# Patient Record
Sex: MALE | Race: WHITE | Employment: FULL TIME | URBAN - METROPOLITAN AREA
[De-identification: names, ages, dates, MRNs, and addresses within clinical notes are randomized per-mention and may not be internally consistent; named-entity substitution may affect disease eponyms.]

---

## 2017-01-10 ENCOUNTER — TELEPHONE (OUTPATIENT)
Dept: FAMILY MEDICINE CLINIC | Facility: CLINIC | Age: 47
End: 2017-01-10

## 2017-01-10 RX ORDER — AZITHROMYCIN 250 MG/1
TABLET, FILM COATED ORAL
Qty: 6 TABLET | Refills: 0 | Status: SHIPPED | OUTPATIENT
Start: 2017-01-10 | End: 2017-11-29

## 2017-02-06 ENCOUNTER — MED REC SCAN ONLY (OUTPATIENT)
Dept: FAMILY MEDICINE CLINIC | Facility: CLINIC | Age: 47
End: 2017-02-06

## 2017-02-06 ENCOUNTER — NURSE ONLY (OUTPATIENT)
Dept: FAMILY MEDICINE CLINIC | Facility: CLINIC | Age: 47
End: 2017-02-06

## 2017-02-06 DIAGNOSIS — M51.36 DEGENERATION OF LUMBAR INTERVERTEBRAL DISC: ICD-10-CM

## 2017-02-06 DIAGNOSIS — M51.16 LUMBAR DISC HERNIATION WITH RADICULOPATHY: ICD-10-CM

## 2017-02-06 DIAGNOSIS — Z98.890 PERSONAL HISTORY OF SPINE SURGERY: Primary | ICD-10-CM

## 2017-02-06 LAB
ALBUMIN SERPL BCP-MCNC: 4 G/DL (ref 3.5–4.8)
ALBUMIN/GLOB SERPL: 1.3 {RATIO} (ref 1–2)
ALP SERPL-CCNC: 67 U/L (ref 32–100)
ALT SERPL-CCNC: 28 U/L (ref 17–63)
ANION GAP SERPL CALC-SCNC: 8 MMOL/L (ref 0–18)
AST SERPL-CCNC: 27 U/L (ref 15–41)
BILIRUB SERPL-MCNC: 0.8 MG/DL (ref 0.3–1.2)
BUN SERPL-MCNC: 18 MG/DL (ref 8–20)
BUN/CREAT SERPL: 22.2 (ref 10–20)
CALCIUM SERPL-MCNC: 9.4 MG/DL (ref 8.5–10.5)
CHLORIDE SERPL-SCNC: 106 MMOL/L (ref 95–110)
CO2 SERPL-SCNC: 26 MMOL/L (ref 22–32)
CREAT SERPL-MCNC: 0.81 MG/DL (ref 0.5–1.5)
GLOBULIN PLAS-MCNC: 3 G/DL (ref 2.5–3.7)
GLUCOSE SERPL-MCNC: 93 MG/DL (ref 70–99)
OSMOLALITY UR CALC.SUM OF ELEC: 292 MOSM/KG (ref 275–295)
POTASSIUM SERPL-SCNC: 4.6 MMOL/L (ref 3.3–5.1)
PROT SERPL-MCNC: 7 G/DL (ref 5.9–8.4)
SODIUM SERPL-SCNC: 140 MMOL/L (ref 136–144)

## 2017-02-06 PROCEDURE — 80053 COMPREHEN METABOLIC PANEL: CPT | Performed by: FAMILY MEDICINE

## 2017-02-06 PROCEDURE — 36415 COLL VENOUS BLD VENIPUNCTURE: CPT | Performed by: FAMILY MEDICINE

## 2017-02-08 ENCOUNTER — HOSPITAL ENCOUNTER (OUTPATIENT)
Dept: MRI IMAGING | Age: 47
Discharge: HOME | End: 2017-02-08
Attending: NEUROLOGICAL SURGERY
Payer: COMMERCIAL

## 2017-02-08 DIAGNOSIS — M51.16 HERNIATION OF LUMBAR INTERVERTEBRAL DISC WITH RADICULOPATHY: ICD-10-CM

## 2017-02-08 DIAGNOSIS — Z98.890 PERSONAL HISTORY OF SPINE SURGERY: ICD-10-CM

## 2017-02-09 ENCOUNTER — HOSPITAL ENCOUNTER (OUTPATIENT)
Dept: MRI IMAGING | Age: 47
Discharge: HOME | End: 2017-02-09
Attending: NEUROLOGICAL SURGERY
Payer: COMMERCIAL

## 2017-02-09 PROCEDURE — A9575 INJ GADOTERATE MEGLUMI 0.1ML: HCPCS | Performed by: NEUROLOGICAL SURGERY

## 2017-02-09 PROCEDURE — 72158 MRI LUMBAR SPINE W/O & W/DYE: CPT

## 2017-02-22 ENCOUNTER — OFFICE VISIT (OUTPATIENT)
Dept: FAMILY MEDICINE CLINIC | Facility: CLINIC | Age: 47
End: 2017-02-22

## 2017-02-22 VITALS — WEIGHT: 288 LBS | BODY MASS INDEX: 40 KG/M2 | DIASTOLIC BLOOD PRESSURE: 70 MMHG | SYSTOLIC BLOOD PRESSURE: 150 MMHG

## 2017-02-22 DIAGNOSIS — G47.30 SLEEP APNEA, UNSPECIFIED TYPE: ICD-10-CM

## 2017-02-22 DIAGNOSIS — Z98.890 HISTORY OF LUMBAR LAMINECTOMY: ICD-10-CM

## 2017-02-22 DIAGNOSIS — I47.1 SVT (SUPRAVENTRICULAR TACHYCARDIA) (HCC): Primary | ICD-10-CM

## 2017-02-22 DIAGNOSIS — M51.16 LUMBAR DISC HERNIATION WITH RADICULOPATHY: ICD-10-CM

## 2017-02-22 PROCEDURE — 99214 OFFICE O/P EST MOD 30 MIN: CPT | Performed by: FAMILY MEDICINE

## 2017-02-27 PROBLEM — Z98.890 HISTORY OF LUMBAR LAMINECTOMY: Status: ACTIVE | Noted: 2017-02-06

## 2017-03-09 ENCOUNTER — OFFICE VISIT (OUTPATIENT)
Dept: FAMILY MEDICINE CLINIC | Facility: CLINIC | Age: 47
End: 2017-03-09

## 2017-03-09 VITALS — DIASTOLIC BLOOD PRESSURE: 80 MMHG | WEIGHT: 289 LBS | SYSTOLIC BLOOD PRESSURE: 160 MMHG | BODY MASS INDEX: 40 KG/M2

## 2017-03-09 DIAGNOSIS — M51.16 LUMBAR DISC HERNIATION WITH RADICULOPATHY: Primary | ICD-10-CM

## 2017-03-09 DIAGNOSIS — M51.36 DEGENERATION OF LUMBAR INTERVERTEBRAL DISC: ICD-10-CM

## 2017-03-09 DIAGNOSIS — R03.0 ELEVATED BLOOD PRESSURE READING: ICD-10-CM

## 2017-03-09 PROCEDURE — 99213 OFFICE O/P EST LOW 20 MIN: CPT | Performed by: FAMILY MEDICINE

## 2017-03-09 RX ORDER — GABAPENTIN 300 MG/1
300 CAPSULE ORAL 3 TIMES DAILY
Qty: 270 CAPSULE | Refills: 3 | Status: SHIPPED | OUTPATIENT
Start: 2017-03-09 | End: 2017-03-09

## 2017-03-09 RX ORDER — MELOXICAM 15 MG/1
15 TABLET ORAL DAILY
Qty: 90 TABLET | Refills: 3 | Status: SHIPPED | OUTPATIENT
Start: 2017-03-09 | End: 2017-03-09

## 2017-03-09 RX ORDER — GABAPENTIN 300 MG/1
300 CAPSULE ORAL 3 TIMES DAILY
Qty: 270 CAPSULE | Refills: 3 | Status: SHIPPED | OUTPATIENT
Start: 2017-03-09 | End: 2017-11-29

## 2017-03-09 RX ORDER — MELOXICAM 15 MG/1
15 TABLET ORAL DAILY
Qty: 90 TABLET | Refills: 3 | Status: SHIPPED | OUTPATIENT
Start: 2017-03-09 | End: 2017-11-29

## 2017-03-09 NOTE — PROGRESS NOTES
[facility] Family Medicine Office Note  Chief Complaint:   Patient presents with:  Back Pain  Numbness: right leg, hurting in ankle area, worse upon waking      HPI:   This is a 48year old male coming in for      Results for orders pl HYDROcodone-acetaminophen  MG Oral Tab Take 1 tablet by mouth every 6 (six) hours as needed for Pain. Disp: 50 tablet Rfl: 0   docusate sodium 100 MG Oral Cap Take 100 mg by mouth 2 (two) times daily.  Disp: 30 capsule Rfl: 0   naproxen 500 MG Oral normal in all 4 quadrants, no masses, no hepatosplenomegaly. BACK: No tenderness, no spasm, SLR test negative, FROM. Well healed scar. EXTREMITIES:  slr positive 30 degrees.  r  NEURO:  No deficit, normal gait, strength and tone, sensory intact, normal re

## 2017-03-19 NOTE — PROGRESS NOTES
[facility] Family Medicine Office Note  Chief Complaint:   Patient presents with:  Back Pain: radiating into right leg  Numbness: right leg      HPI:   This is a 48year old male coming in for      Results for orders placed or performe  MG Oral Tab Take 1 tablet by mouth every 6 (six) hours as needed for Pain. Disp: 50 tablet Rfl: 0   docusate sodium 100 MG Oral Cap Take 100 mg by mouth 2 (two) times daily.  Disp: 30 capsule Rfl: 0   naproxen 500 MG Oral Tab Take 1 tablet (500 mg to masses, no hepatosplenomegaly. BACK: limited rom 2ry to pain, mod diffuse paraspinal tenderness  EXTREMITIES:  No edema, no cyanosis, no clubbing, FROM, 2+ dorsalis pedis pulses bilaterally.   NEURO: hypoesthesia l5-s1, s1-s2 levels, also tender to palpati

## 2017-04-10 ENCOUNTER — MED REC SCAN ONLY (OUTPATIENT)
Dept: FAMILY MEDICINE CLINIC | Facility: CLINIC | Age: 47
End: 2017-04-10

## 2017-04-19 ENCOUNTER — OFFICE VISIT (OUTPATIENT)
Dept: FAMILY MEDICINE CLINIC | Facility: CLINIC | Age: 47
End: 2017-04-19

## 2017-04-19 VITALS — SYSTOLIC BLOOD PRESSURE: 118 MMHG | WEIGHT: 289 LBS | DIASTOLIC BLOOD PRESSURE: 80 MMHG | BODY MASS INDEX: 40 KG/M2

## 2017-04-19 DIAGNOSIS — M51.16 LUMBAR DISC HERNIATION WITH RADICULOPATHY: Primary | ICD-10-CM

## 2017-04-19 DIAGNOSIS — M51.36 DEGENERATION OF LUMBAR INTERVERTEBRAL DISC: ICD-10-CM

## 2017-04-19 PROCEDURE — 99213 OFFICE O/P EST LOW 20 MIN: CPT | Performed by: FAMILY MEDICINE

## 2017-04-19 NOTE — PROGRESS NOTES
[facility] Family Medicine Office Note  Chief Complaint:   Patient presents with:  Back Pain  Numbness: right foot, feeling pain in it now      HPI:   This is a 48year old male coming in for f\u back pain.       Results for orders plac HYDROcodone-acetaminophen  MG Oral Tab Take 1 tablet by mouth every 6 (six) hours as needed for Pain. Disp: 50 tablet Rfl: 0   docusate sodium 100 MG Oral Cap Take 100 mg by mouth 2 (two) times daily.  Disp: 30 capsule Rfl: 0   naproxen 500 MG Oral normal in all 4 quadrants, no masses, no hepatosplenomegaly. BACK: diffuse paraspinal tenderness c limited rom. Plus 2 reflexes  EXTREMITIES:  No edema, no cyanosis, no clubbing, FROM, 2+ dorsalis pedis pulses bilaterally.   NEURO:  No deficit, normal gait

## 2017-06-01 ENCOUNTER — OFFICE VISIT (OUTPATIENT)
Dept: FAMILY MEDICINE CLINIC | Facility: CLINIC | Age: 47
End: 2017-06-01

## 2017-06-01 VITALS — DIASTOLIC BLOOD PRESSURE: 80 MMHG | SYSTOLIC BLOOD PRESSURE: 138 MMHG | BODY MASS INDEX: 41 KG/M2 | WEIGHT: 292.5 LBS

## 2017-06-01 DIAGNOSIS — M51.16 LUMBAR DISC HERNIATION WITH RADICULOPATHY: Primary | ICD-10-CM

## 2017-06-01 DIAGNOSIS — M51.36 DEGENERATION OF LUMBAR INTERVERTEBRAL DISC: ICD-10-CM

## 2017-06-01 PROCEDURE — 99213 OFFICE O/P EST LOW 20 MIN: CPT | Performed by: FAMILY MEDICINE

## 2017-06-01 RX ORDER — HYDROCODONE BITARTRATE AND ACETAMINOPHEN 10; 325 MG/1; MG/1
1 TABLET ORAL EVERY 6 HOURS PRN
Qty: 40 TABLET | Refills: 0 | Status: SHIPPED | OUTPATIENT
Start: 2017-06-01 | End: 2017-11-29

## 2017-06-01 RX ORDER — METHYLPREDNISOLONE 4 MG/1
TABLET ORAL
Qty: 1 KIT | Refills: 1 | Status: SHIPPED | OUTPATIENT
Start: 2017-06-01 | End: 2017-11-29

## 2017-06-01 NOTE — PROGRESS NOTES
[facility] Family Medicine Office Note  Chief Complaint:   Patient presents with:  Back Pain: jolting pain into right leg, started on Friday has gotten worse      HPI:   This is a 48year old male coming in for      Results for orders HYDROcodone-acetaminophen  MG Oral Tab Take 1 tablet by mouth every 6 (six) hours as needed for Pain. Disp: 50 tablet Rfl: 0   docusate sodium 100 MG Oral Cap Take 100 mg by mouth 2 (two) times daily.  Disp: 30 capsule Rfl: 0   naproxen 500 MG Oral sounds normal in all 4 quadrants, no masses, no hepatosplenomegaly. BACK: No tenderness, no spasm, SLR test negative, FROM. EXTREMITIES:  No edema, no cyanosis, no clubbing, FROM, 2+ dorsalis pedis pulses bilaterally.   NEURO:  No deficit, normal gait, st

## 2017-06-21 ENCOUNTER — OFFICE VISIT (OUTPATIENT)
Dept: FAMILY MEDICINE CLINIC | Facility: CLINIC | Age: 47
End: 2017-06-21

## 2017-06-21 VITALS
DIASTOLIC BLOOD PRESSURE: 80 MMHG | BODY MASS INDEX: 40.04 KG/M2 | SYSTOLIC BLOOD PRESSURE: 130 MMHG | HEIGHT: 71 IN | WEIGHT: 286 LBS

## 2017-06-21 DIAGNOSIS — R03.0 ELEVATED BLOOD PRESSURE READING: Primary | ICD-10-CM

## 2017-06-21 DIAGNOSIS — G47.30 SLEEP APNEA, UNSPECIFIED TYPE: ICD-10-CM

## 2017-06-21 DIAGNOSIS — Z98.890 HISTORY OF LUMBAR LAMINECTOMY: ICD-10-CM

## 2017-06-21 DIAGNOSIS — M51.16 LUMBAR DISC HERNIATION WITH RADICULOPATHY: ICD-10-CM

## 2017-06-21 PROCEDURE — 99213 OFFICE O/P EST LOW 20 MIN: CPT | Performed by: FAMILY MEDICINE

## 2017-06-21 NOTE — PROGRESS NOTES
[address] Office Note  Chief Complaint:   Patient presents with:  Low Back Pain: raidating into right leg with numbness      HPI:   This is a 48year old male coming in for f\u.       Results for orders placed or perfor Oral Cap Take 1 capsule (300 mg total) by mouth 3 (three) times daily. Disp: 270 capsule Rfl: 3   Meloxicam (MOBIC) 15 MG Oral Tab Take 1 tablet (15 mg total) by mouth daily.  Disp: 90 tablet Rfl: 3   azithromycin (ZITHROMAX Z-ANGELIA) 250 MG Oral Tab Take two lesion, no bruising, good turgor. HEART:  Regular rate and rhythm, no murmurs, rubs or gallops. LUNGS: Clear to auscultation bilterally, no rales/rhonchi/wheezing. CHEST: No tenderness.   ABDOMEN:  Soft, nondistended, nontender, bowel sounds normal in al

## 2017-06-28 ENCOUNTER — TELEPHONE (OUTPATIENT)
Dept: FAMILY MEDICINE CLINIC | Facility: CLINIC | Age: 47
End: 2017-06-28

## 2017-11-10 ENCOUNTER — TELEPHONE (OUTPATIENT)
Dept: FAMILY MEDICINE CLINIC | Facility: CLINIC | Age: 47
End: 2017-11-10

## 2017-11-10 RX ORDER — LEVOCETIRIZINE DIHYDROCHLORIDE 5 MG/1
TABLET, FILM COATED ORAL
Qty: 90 TABLET | Refills: 3 | Status: SHIPPED | OUTPATIENT
Start: 2017-11-10 | End: 2017-11-29

## 2017-11-29 ENCOUNTER — OFFICE VISIT (OUTPATIENT)
Dept: FAMILY MEDICINE CLINIC | Facility: CLINIC | Age: 47
End: 2017-11-29

## 2017-11-29 VITALS — DIASTOLIC BLOOD PRESSURE: 80 MMHG | BODY MASS INDEX: 36 KG/M2 | SYSTOLIC BLOOD PRESSURE: 120 MMHG | WEIGHT: 257 LBS

## 2017-11-29 DIAGNOSIS — R03.0 ELEVATED BLOOD PRESSURE READING: ICD-10-CM

## 2017-11-29 DIAGNOSIS — M51.36 DEGENERATIVE DISC DISEASE, LUMBAR: Primary | ICD-10-CM

## 2017-11-29 PROCEDURE — 99213 OFFICE O/P EST LOW 20 MIN: CPT | Performed by: FAMILY MEDICINE

## 2017-11-29 NOTE — PROGRESS NOTES
[facility] Office Note  Chief Complaint:   Patient presents with:  Numbness: right foot and toes      HPI:   This is a 50year old male coming in for f\u back pain.       Results for orders placed or performed in visit o hours as needed for Pain. Disp: 40 tablet Rfl: 0   gabapentin 300 MG Oral Cap Take 1 capsule (300 mg total) by mouth 3 (three) times daily. Disp: 270 capsule Rfl: 3   Meloxicam (MOBIC) 15 MG Oral Tab Take 1 tablet (15 mg total) by mouth daily.  Disp: 90 tab JVD, no thyromegaly. SKIN: No rashes, no skin lesion, no bruising, good turgor. HEART:  Regular rate and rhythm, no murmurs, rubs or gallops. LUNGS: Clear to auscultation bilterally, no rales/rhonchi/wheezing. CHEST: No tenderness.   ABDOMEN:  Soft, non

## 2019-05-20 PROBLEM — J30.9 RHINITIS, ALLERGIC: Status: ACTIVE | Noted: 2019-05-20

## 2020-04-01 ENCOUNTER — HOSPITAL ENCOUNTER (EMERGENCY)
Facility: HOSPITAL | Age: 50
Discharge: HOME | End: 2020-04-01
Attending: EMERGENCY MEDICINE
Payer: COMMERCIAL

## 2020-04-01 VITALS
SYSTOLIC BLOOD PRESSURE: 158 MMHG | HEIGHT: 71 IN | OXYGEN SATURATION: 97 % | RESPIRATION RATE: 20 BRPM | HEART RATE: 99 BPM | TEMPERATURE: 101 F | WEIGHT: 280 LBS | DIASTOLIC BLOOD PRESSURE: 90 MMHG | BODY MASS INDEX: 39.2 KG/M2

## 2020-04-01 DIAGNOSIS — B34.9 VIRAL SYNDROME: Primary | ICD-10-CM

## 2020-04-01 PROCEDURE — 99283 EMERGENCY DEPT VISIT LOW MDM: CPT

## 2020-04-01 RX ORDER — ACETAMINOPHEN 500 MG
1000 TABLET ORAL ONCE
Status: COMPLETED | OUTPATIENT
Start: 2020-04-01 | End: 2020-04-01

## 2020-04-01 NOTE — ED INITIAL ASSESSMENT (HPI)
Fevers since Saturday, cough started today.  Taking tylenol as needed  Patient is a  with positive exposure

## 2020-04-01 NOTE — ED PROVIDER NOTES
Patient Seen in: [facility] AND [facility] Emergency Department      History   Patient presents with:  Cough/URI    Stated Complaint:     HPI    73-year-old male with history of hyperlipidemia,Sleep apnea, here with complaints of cough, chest tightness, fever to Genitourinary: Negative for dysuria, flank pain and frequency. Musculoskeletal: Negative for back pain. Skin: Negative for rash. Neurological: Negative for weakness, light-headedness and headaches. All other systems reviewed and are negative. asymmetry, normal speech             ED Course     Pulse Oximeter:  Pulse oximetry on room air is 97%, indicating adequate oxygenation.     PROCEDURES:  none    DIAGNOSTICS:   Labs:  No results found for this or any previous visit (from the past 24 hour(s)) presenting problem including covid 19, PE, pneumonia, viral syndrome.           Disposition and Plan     Clinical Impression:  Viral syndrome  (primary encounter diagnosis)    Disposition:  Discharge  4/1/2020 12:18 pm    Follow-up:  Iftikhar Bonds MD  1200

## 2020-04-02 ENCOUNTER — TELEPHONE (OUTPATIENT)
Dept: INTERNAL MEDICINE CLINIC | Facility: CLINIC | Age: 50
End: 2020-04-02

## 2023-09-12 ENCOUNTER — LAB ENCOUNTER (OUTPATIENT)
Dept: LAB | Facility: HOSPITAL | Age: 53
End: 2023-09-12
Attending: FAMILY MEDICINE
Payer: COMMERCIAL

## 2023-09-12 PROCEDURE — 84153 ASSAY OF PSA TOTAL: CPT | Performed by: FAMILY MEDICINE

## 2023-09-12 PROCEDURE — 36415 COLL VENOUS BLD VENIPUNCTURE: CPT | Performed by: FAMILY MEDICINE

## 2023-09-12 PROCEDURE — 80061 LIPID PANEL: CPT | Performed by: FAMILY MEDICINE

## 2023-09-12 PROCEDURE — 83036 HEMOGLOBIN GLYCOSYLATED A1C: CPT | Performed by: FAMILY MEDICINE

## 2023-09-12 PROCEDURE — 80053 COMPREHEN METABOLIC PANEL: CPT | Performed by: FAMILY MEDICINE

## 2023-09-12 PROCEDURE — 85025 COMPLETE CBC W/AUTO DIFF WBC: CPT | Performed by: FAMILY MEDICINE

## 2023-09-13 ENCOUNTER — HOSPITAL ENCOUNTER (OUTPATIENT)
Dept: ULTRASOUND IMAGING | Facility: HOSPITAL | Age: 53
Discharge: HOME | End: 2023-09-13
Attending: FAMILY MEDICINE
Payer: COMMERCIAL

## 2023-09-13 DIAGNOSIS — R10.13 DYSPEPSIA: ICD-10-CM

## 2023-09-13 PROCEDURE — 76705 ECHO EXAM OF ABDOMEN: CPT | Performed by: FAMILY MEDICINE

## 2023-12-12 NOTE — H&P
[facility], [facility] - Gastroenterology                                                                                                               Reason for consult:   Chief Complaint   Patient presents with    Abdominal Pain    Colonoscopy Screening     Previous Colon / EGD Screen 10+ Years        Requesting physician or provider: Norberto Velásquez MD      HPI:   Cricket Bradley is a 52year old year-old male with history of SVT, hypercholesteremia, DDD, allergic rhinitis, JARED who presents for chronic diarrhea. Has been seen by PCP for chronic diarrhea. CBC and CMP in September within normal. Has tried hyoscyamine, Viberzi, diphenoxylate-atropine without any relief. Had ultrasound done that demonstrated a gall bladder polyp vs stone in gall bladder. No intrahepatic dilation. No prior stool studies. Has been struggling with diarrhea over last 15 years. He notes it is mostly after eating, but often can be associated with nervousness. He states it started with just spicy foods, but now associated with any type of spice, dairy, fat/greasy item. Can have diarrhea immediately after eating or about 2 hours after. Can get cramping then relief with bowel movement. Can have associated bloating that can distend his abdomen. Stools are usually formed and brown in color, but can have episodes where it is softer/water like. Acid reflux once and a while. No dysyphagia, globus, nausea or vomiting. No blood in stool. No dark black colored. he denies recent change in appetite and/or unintentional weight loss. No changes in medication. No travel. No recent antibiotic use. Has had allergy testing done. Notes most food allergies were related to different nuts.        NSAIDS: PRN  Tobacco: prior cigarette use over 21 years  Alcohol: moderate use  Marijuana: none  Illicit drugs: none    FH GI malignancy- none  FH celiac dz- none  FH liver dz- none  FH IBD- none    No history of adverse reaction to sedation  No JARED  No anticoagulants  No pacemaker/defibrillator  No pain medications and/or sleep aides      Last colonoscopy: yes, over 10 years ago (for diarrhea)  Last EGD: yes, over 10 years ago (for diarrhea)    Wt Readings from Last 6 Encounters:   23 287 lb (130.2 kg)   10/24/23 296 lb (134.3 kg)   10/11/23 282 lb (127.9 kg)   23 287 lb (130.2 kg)   22 280 lb (127 kg)   22 280 lb (127 kg)        History, Medications, Allergies, ROS:      Past Medical History:   Diagnosis Date    Back problem     High cholesterol     Osteoarthritis     Sleep apnea       Past Surgical History:   Procedure Laterality Date    Cole Cadena  14    Dr. Neri Tatum SURGERY PROC UNLISTED Right     torn ligaments    NDSC ABLATION & RCNSTJ ATRIA LMTD W/O BYPASS      TONSILLECTOMY        Family Hx:   Family History   Problem Relation Age of Onset    Colon Cancer Neg       Social History:   Social History     Socioeconomic History    Marital status:    Tobacco Use    Smoking status: Former     Packs/day: 1.50     Years: 10.00     Additional pack years: 0.00     Total pack years: 15.00     Types: Cigarettes     Quit date: 2002     Years since quittin.9    Smokeless tobacco: Never   Vaping Use    Vaping Use: Never used   Substance and Sexual Activity    Alcohol use: No    Drug use: No        Medications (Active prior to today's visit):  Current Outpatient Medications   Medication Sig Dispense Refill    PEG 3350-KCl-Na Bicarb-NaCl (TRILYTE) 420 g Oral Recon Soln Take prep as directed by gastro office. May substitute with Trilyte/generic equivalent if needed. 1 each 0    diphenoxylate-atropine 2.5-0.025 MG Oral Tab Take 2 tablets by mouth in the morning, at noon, and at bedtime. 60 tablet 1    ALPRAZolam 0.25 MG Oral Tab Take 1 tablet (0.25 mg total) by mouth every 6 (six) hours as needed for Sleep.  30 tablet 1    Sildenafil Citrate (VIAGRA) 100 MG Oral Tab Take 1 tablet (100 mg total) by mouth as needed for Erectile Dysfunction. 24 tablet 3       Allergies:  No Known Allergies    ROS:   CONSTITUTIONAL: negative for fevers, chills, sweats and weight loss  EYES Negative for red eyes, yellow eyes, changes in vision  HEENT: Negative for dysphagia and hoarseness  RESPIRATORY: Negative for cough and shortness of breath  CARDIOVASCULAR: Negative for chest pain, palpitations  GASTROINTESTINAL: See HPI  GENITOURINARY: Negative for dysuria and frequency  MUSCULOSKELETAL: Negative for arthralgias and myalgias  NEUROLOGICAL: Negative for dizziness and headaches  BEHAVIOR/PSYCH: Negative for anxiety and poor appetite    PHYSICAL EXAM:   Blood pressure (!) 145/98, pulse 70, height 5' 11\" (1.803 m), weight 287 lb (130.2 kg). GEN: WD/WN, NAD  HEENT: Supple symmetrical, trachea midline  CV: RRR, the extremities are warm and well perfused   LUNGS: No increased work of breathing  ABDOMEN: No scars, normal bowel sounds, soft, non-tender, non-distended no rebound or guarding, no masses, no hepatomegaly  MSK: No redness, no warmth, no swelling of joints  SKIN: No jaundice, no erythema, no rashes  HEMATOLOGIC: No bleeding, no bruising  NEURO: Alert and interactive, normal gait    Labs/Imaging/Procedures:     Patient's pertinent labs and imaging were reviewed and discussed with patient today.      Lab Results   Component Value Date    WBC 7.1 09/12/2023    RBC 4.52 09/12/2023    HGB 13.6 09/12/2023    HCT 38.6 (L) 09/12/2023    MCV 85.4 09/12/2023    MCH 30.1 09/12/2023    MCHC 35.2 09/12/2023    RDW 12.2 09/12/2023    .0 09/12/2023    MPV 8.8 08/05/2016        Lab Results   Component Value Date     (H) 09/12/2023    BUN 18 09/12/2023    BUNCREA 20.7 (H) 09/12/2023    CREATSERUM 0.87 09/12/2023    ANIONGAP 6 09/12/2023    GFRNAA 101 08/16/2021    GFRAA 117 08/16/2021    CA 8.9 09/12/2023    OSMOCALC 298 (H) 09/12/2023    ALKPHO 66 09/12/2023    AST 23 09/12/2023    ALT 28 09/12/2023    ALKPHOS 68 08/05/2016    BILT 0.8 09/12/2023    TP 6.9 09/12/2023    ALB 3.6 09/12/2023    GLOBULIN 3.3 09/12/2023    AGRATIO 1.4 08/16/2021     09/12/2023    K 3.6 09/12/2023     09/12/2023    CO2 28.0 09/12/2023        No results found. .  ASSESSMENT/PLAN:   Arben Heath is a 52year old year-old male with history of SVT, hypercholesteremia, DDD, allergic rhinitis, JARED who presents for chronic diarrhea. #diarrhea   #crc screening  Patient extensive hx of diarrhea over last 15 years. Associated mostly with food or nervousness. Often formed and brown with abdominal cramping. Cramping relieved with BM. CMP, CBC September within normal. A1C last 5.3. No family hx of colon cancer, IBS or celiac disease. Discussed possible IBS-D, sibo, microscopic colitis or other luminal diseases that can be evaluated with colonoscopy. Will also get celiac testing, thyroid testing to confirm to alternative cause. Patient agreeable to plan. All questions answered. Recommendations:  - get stool and lab testing done  - Jose Manuel Fonseca will contact with results  - can continue to use imodium at this time   - if all testing is negative will trial xifaxian     1. Schedule colonoscopy with Dr. Jason Dobbins or Dr. Riki Hilliard with [facility] [Diagnosis: crc screening, diarrhea]    2.  bowel prep from pharmacy (split trilyte)    3. Continue all medications as normal for your procedure. 4. Read all bowel prep instructions carefully. Bowel prep instructions can also be found online at:  [email or web address]     5. AVOID seeds, nuts, popcorn, raw fruits and vegetables for 3 days before procedure    6. You MAY need to go for COVID testing 72 hours before procedure. The testing team will call you a few days before your procedure to discuss with you if testing is required. If you are asked to go for COVID testing and do not completed the test, the procedure cannot be performed.      7. If you start any NEW medication after your visit today, please notify us. Certain medications (like iron or weight loss medications) will need to be held before the procedure, or the procedure cannot be performed safely. Orders This Visit:  Orders Placed This Encounter   Procedures    Tissue Transglutaminase Ab, IgA    Immunoglobulin A, Quant    Calprotectin, Fecal    Comp Metabolic Panel (14)    CBC W Differential W Platelet    TSH W Reflex To Free T4       Meds This Visit:  Requested Prescriptions     Signed Prescriptions Disp Refills    PEG 3350-KCl-Na Bicarb-NaCl (TRILYTE) 420 g Oral Recon Soln 1 each 0     Sig: Take prep as directed by gastro office. May substitute with Trilyte/generic equivalent if needed. Imaging & Referrals:  None    ENDOSCOPIC RISK BENEFIT DISCUSSION: I described the procedure in great detail with the patient. I discussed the risks and benefits, including but not limited to: bleeding, perforation, infection, anesthesia complications, and even death. Patient will be NPO after midnight and will have a person physically present at time of pick-up to drive patient home. Patient verbalized understanding and agrees to proceed with procedure as planned. Cyril Credit, GRZEGORZ   12/14/2023        This note was partially prepared using [employer] voice recognition dictation software. As a result, errors may occur. When identified, these errors have been corrected.  While every attempt is made to correct errors during dictation, discrepancies may still exist.

## 2023-12-14 ENCOUNTER — OFFICE VISIT (OUTPATIENT)
Facility: CLINIC | Age: 53
End: 2023-12-14
Payer: COMMERCIAL

## 2023-12-14 ENCOUNTER — LAB ENCOUNTER (OUTPATIENT)
Dept: LAB | Facility: HOSPITAL | Age: 53
End: 2023-12-14
Attending: PHYSICIAN ASSISTANT
Payer: COMMERCIAL

## 2023-12-14 ENCOUNTER — TELEPHONE (OUTPATIENT)
Facility: CLINIC | Age: 53
End: 2023-12-14

## 2023-12-14 VITALS
WEIGHT: 287 LBS | DIASTOLIC BLOOD PRESSURE: 98 MMHG | SYSTOLIC BLOOD PRESSURE: 145 MMHG | BODY MASS INDEX: 40.18 KG/M2 | HEIGHT: 71 IN | HEART RATE: 70 BPM

## 2023-12-14 DIAGNOSIS — R19.7 DIARRHEA, UNSPECIFIED TYPE: Primary | ICD-10-CM

## 2023-12-14 DIAGNOSIS — Z12.11 COLON CANCER SCREENING: ICD-10-CM

## 2023-12-14 DIAGNOSIS — Z12.11 COLON CANCER SCREENING: Primary | ICD-10-CM

## 2023-12-14 LAB
ALBUMIN SERPL-MCNC: 4.4 G/DL (ref 3.2–4.8)
ALBUMIN/GLOB SERPL: 1.5 {RATIO} (ref 1–2)
ALP LIVER SERPL-CCNC: 78 U/L
ALT SERPL-CCNC: 21 U/L
ANION GAP SERPL CALC-SCNC: 4 MMOL/L (ref 0–18)
AST SERPL-CCNC: 24 U/L (ref ?–34)
BASOPHILS # BLD AUTO: 0.03 X10(3) UL (ref 0–0.2)
BASOPHILS NFR BLD AUTO: 0.5 %
BILIRUB SERPL-MCNC: 0.9 MG/DL (ref 0.3–1.2)
BUN BLD-MCNC: 20 MG/DL (ref 9–23)
BUN/CREAT SERPL: 20.8 (ref 10–20)
CALCIUM BLD-MCNC: 9.6 MG/DL (ref 8.7–10.4)
CHLORIDE SERPL-SCNC: 106 MMOL/L (ref 98–112)
CO2 SERPL-SCNC: 28 MMOL/L (ref 21–32)
CREAT BLD-MCNC: 0.96 MG/DL
DEPRECATED RDW RBC AUTO: 38.1 FL (ref 35.1–46.3)
EGFRCR SERPLBLD CKD-EPI 2021: 93 ML/MIN/1.73M2 (ref 60–?)
EOSINOPHIL # BLD AUTO: 0.13 X10(3) UL (ref 0–0.7)
EOSINOPHIL NFR BLD AUTO: 2.1 %
ERYTHROCYTE [DISTWIDTH] IN BLOOD BY AUTOMATED COUNT: 11.9 % (ref 11–15)
FASTING STATUS PATIENT QL REPORTED: YES
GLOBULIN PLAS-MCNC: 3 G/DL (ref 2.8–4.4)
GLUCOSE BLD-MCNC: 97 MG/DL (ref 70–99)
HCT VFR BLD AUTO: 43.7 %
HGB BLD-MCNC: 14.7 G/DL
IGA SERPL-MCNC: 226.2 MG/DL (ref 40–350)
IMM GRANULOCYTES # BLD AUTO: 0.02 X10(3) UL (ref 0–1)
IMM GRANULOCYTES NFR BLD: 0.3 %
LYMPHOCYTES # BLD AUTO: 2.13 X10(3) UL (ref 1–4)
LYMPHOCYTES NFR BLD AUTO: 34.3 %
MCH RBC QN AUTO: 29.3 PG (ref 26–34)
MCHC RBC AUTO-ENTMCNC: 33.6 G/DL (ref 31–37)
MCV RBC AUTO: 87.1 FL
MONOCYTES # BLD AUTO: 0.53 X10(3) UL (ref 0.1–1)
MONOCYTES NFR BLD AUTO: 8.5 %
NEUTROPHILS # BLD AUTO: 3.37 X10 (3) UL (ref 1.5–7.7)
NEUTROPHILS # BLD AUTO: 3.37 X10(3) UL (ref 1.5–7.7)
NEUTROPHILS NFR BLD AUTO: 54.3 %
OSMOLALITY SERPL CALC.SUM OF ELEC: 289 MOSM/KG (ref 275–295)
PLATELET # BLD AUTO: 229 10(3)UL (ref 150–450)
POTASSIUM SERPL-SCNC: 4.2 MMOL/L (ref 3.5–5.1)
PROT SERPL-MCNC: 7.4 G/DL (ref 5.7–8.2)
RBC # BLD AUTO: 5.02 X10(6)UL
SODIUM SERPL-SCNC: 138 MMOL/L (ref 136–145)
TSI SER-ACNC: 2.4 MIU/ML (ref 0.55–4.78)
WBC # BLD AUTO: 6.2 X10(3) UL (ref 4–11)

## 2023-12-14 PROCEDURE — 86364 TISS TRNSGLTMNASE EA IG CLAS: CPT | Performed by: PHYSICIAN ASSISTANT

## 2023-12-14 PROCEDURE — 84443 ASSAY THYROID STIM HORMONE: CPT | Performed by: PHYSICIAN ASSISTANT

## 2023-12-14 PROCEDURE — 85025 COMPLETE CBC W/AUTO DIFF WBC: CPT | Performed by: PHYSICIAN ASSISTANT

## 2023-12-14 PROCEDURE — 36415 COLL VENOUS BLD VENIPUNCTURE: CPT | Performed by: PHYSICIAN ASSISTANT

## 2023-12-14 PROCEDURE — 82784 ASSAY IGA/IGD/IGG/IGM EACH: CPT | Performed by: PHYSICIAN ASSISTANT

## 2023-12-14 PROCEDURE — 80053 COMPREHEN METABOLIC PANEL: CPT | Performed by: PHYSICIAN ASSISTANT

## 2023-12-14 RX ORDER — POLYETHYLENE GLYCOL 3350, SODIUM CHLORIDE, SODIUM BICARBONATE, POTASSIUM CHLORIDE 420; 11.2; 5.72; 1.48 G/4L; G/4L; G/4L; G/4L
POWDER, FOR SOLUTION ORAL
Qty: 1 EACH | Refills: 0 | Status: SHIPPED | OUTPATIENT
Start: 2023-12-14

## 2023-12-14 NOTE — TELEPHONE ENCOUNTER
Scheduled for:  Colonoscopy 15000 / 97251  Provider Name:  Dr Jose Antonio Umaña    Date:  5/15/2024  Time:   2:45PM (Patient aware to arrive ONE hour early)  Location:  [facility])    Sedation:  [facility]  Prep:  Split TriLyte    Meds/Allergies Reconciled?:   Provider Reviewed     Diagnosis with codes:    Colon screen Z12.11    Was patient informed to call insurance with codes (Y/N):  Yes     Referral sent?: Referral was sent at the time of electronic surgical scheduling. [facility] or [address] notified?: I sent an electronic request to ENDO Scheduling and received a confirmation today. Medication Orders:  Patient is aware to NOT take iron pills, herbal meds and diet supplements for 7 days before exam. Also to NOT take any form of alcohol, recreational drugs and any forms of ED meds 24 hours before exam.     Misc Orders:  N/A     Further instructions given by staff:  I Provided prep instructions to patient and reviewed date, time and location. Patient verbalized that  He / His understood and is aware to call with any questions. Patient was informed about the new cancellation policy for He / His procedure. Patient was also given copy of the cancellation policy at the time of the appointment and verbalized understanding.

## 2023-12-14 NOTE — PATIENT INSTRUCTIONS
Recommendations:  - get stool and lab testing done  - Aurea Brewer will contact with results  - can continue to use imodium at this time   - if all testing is negative will trial xifaxian     1. Schedule colonoscopy with Dr. Felix Littlejohn or Dr. Osbaldo Miller with [facility] [Diagnosis: crc screening, diarrhea]    2.  bowel prep from pharmacy (split trilyte)    3. Continue all medications as normal for your procedure. 4. Read all bowel prep instructions carefully. Bowel prep instructions can also be found online at:  [email or web address]     5. AVOID seeds, nuts, popcorn, raw fruits and vegetables for 3 days before procedure    6. You MAY need to go for COVID testing 72 hours before procedure. The testing team will call you a few days before your procedure to discuss with you if testing is required. If you are asked to go for COVID testing and do not completed the test, the procedure cannot be performed. 7. If you start any NEW medication after your visit today, please notify us. Certain medications (like iron or weight loss medications) will need to be held before the procedure, or the procedure cannot be performed safely.

## 2023-12-15 LAB — TTG IGA SER-ACNC: 0.5 U/ML (ref ?–7)

## 2023-12-19 ENCOUNTER — LAB ENCOUNTER (OUTPATIENT)
Dept: LAB | Facility: HOSPITAL | Age: 53
End: 2023-12-19
Attending: PHYSICIAN ASSISTANT
Payer: COMMERCIAL

## 2023-12-19 DIAGNOSIS — R19.7 DIARRHEA OF PRESUMED INFECTIOUS ORIGIN: Primary | ICD-10-CM

## 2023-12-19 PROCEDURE — 83993 ASSAY FOR CALPROTECTIN FECAL: CPT

## 2023-12-21 ENCOUNTER — TELEPHONE (OUTPATIENT)
Facility: CLINIC | Age: 53
End: 2023-12-21

## 2023-12-21 LAB — CALPROTECTIN STL-MCNT: 27.6 ΜG/G (ref ?–50)

## 2023-12-22 NOTE — TELEPHONE ENCOUNTER
Called and discussed stool testing with  Denise Hilda. Will trial xifaxan x 14 days starting 1/2/24. Discussed SE and adverse reactions to medications. Plan to contact office once completed. Patient acknowledged understanding at this time. All questions answered.      Lorenzo Hutchins PA-C

## 2024-05-06 ENCOUNTER — TELEPHONE (OUTPATIENT)
Facility: CLINIC | Age: 54
End: 2024-05-06

## 2024-05-06 NOTE — TELEPHONE ENCOUNTER
Called and spoke with patient, name/ verified.    Stated he took Zepbound last night (takes this weekly on Sundays).    He is aware not to take it until after his 5/15/24 colonoscopy, he just wants to make sure and I confirmed this correct med instruction w/ him.     He already has the prep and prep instructions.     Patient verbalized understanding, no further concerns, issues at this time.

## 2024-05-06 NOTE — TELEPHONE ENCOUNTER
Patient calling regards questions on new medication, (zepbound) prior to procedure. Please call.

## 2024-05-08 NOTE — PAT NURSING NOTE
Per the hospital protocol, pt notified to hold Viagra 72 hours and ZEPBOUND 1 week prior to scheduled procedure. Colonoscopy on 5/15/2024 with Dr. Rodrigues.

## 2024-05-15 ENCOUNTER — TELEPHONE (OUTPATIENT)
Dept: GASTROENTEROLOGY | Facility: CLINIC | Age: 54
End: 2024-05-15

## 2024-05-15 ENCOUNTER — ANESTHESIA (OUTPATIENT)
Dept: ENDOSCOPY | Facility: HOSPITAL | Age: 54
End: 2024-05-15

## 2024-05-15 ENCOUNTER — HOSPITAL ENCOUNTER (OUTPATIENT)
Facility: HOSPITAL | Age: 54
Setting detail: HOSPITAL OUTPATIENT SURGERY
Discharge: HOME | End: 2024-05-15
Attending: INTERNAL MEDICINE | Admitting: INTERNAL MEDICINE

## 2024-05-15 ENCOUNTER — ANESTHESIA EVENT (OUTPATIENT)
Dept: ENDOSCOPY | Facility: HOSPITAL | Age: 54
End: 2024-05-15

## 2024-05-15 VITALS
OXYGEN SATURATION: 97 % | HEART RATE: 64 BPM | WEIGHT: 277 LBS | SYSTOLIC BLOOD PRESSURE: 138 MMHG | DIASTOLIC BLOOD PRESSURE: 80 MMHG | RESPIRATION RATE: 14 BRPM | BODY MASS INDEX: 38.78 KG/M2 | HEIGHT: 71 IN

## 2024-05-15 DIAGNOSIS — Z12.11 COLON CANCER SCREENING: ICD-10-CM

## 2024-05-15 PROCEDURE — 45378 DIAGNOSTIC COLONOSCOPY: CPT | Performed by: INTERNAL MEDICINE

## 2024-05-15 PROCEDURE — 0DJD8ZZ INSPECTION OF LOWER INTESTINAL TRACT, VIA NATURAL OR ARTIFICIAL OPENING ENDOSCOPIC: ICD-10-PCS | Performed by: INTERNAL MEDICINE

## 2024-05-15 RX ORDER — SODIUM CHLORIDE, SODIUM LACTATE, POTASSIUM CHLORIDE, CALCIUM CHLORIDE 600; 310; 30; 20 MG/100ML; MG/100ML; MG/100ML; MG/100ML
INJECTION, SOLUTION INTRAVENOUS CONTINUOUS
Status: DISCONTINUED | OUTPATIENT
Start: 2024-05-15 | End: 2024-05-15

## 2024-05-15 RX ADMIN — SODIUM CHLORIDE, SODIUM LACTATE, POTASSIUM CHLORIDE, CALCIUM CHLORIDE: 600; 310; 30; 20 INJECTION, SOLUTION INTRAVENOUS at 14:40:00

## 2024-05-15 NOTE — DISCHARGE INSTRUCTIONS
Home Care Instructions for Colonoscopy with Sedation    Diet:  - Resume your regular diet as tolerated unless otherwise instructed.  - Start with light meals to minimize bloating.  - Do not drink alcohol today.    Medication:  - If you have questions about resuming your normal medications, please contact your Primary Care Physician.    Activities:  - Take it easy today. Do not return to work today.  - Do not drive today.  - Do not operate any machinery today (including kitchen equipment).  -   Do not make any critical decisions or sign any paperwork.  - Do not exercise today.    Colonoscopy:  - You may notice some rectal \"spotting\" (a little blood on the toilet tissue) for a day or two after the exam. This is normal.  - If you experience any rectal bleeding (not spotting), persistent tenderness or sharp severe abdominal pains, oral temperature over 100 degrees Fahrenheit, light-headedness or dizziness, or any other problems, contact your doctor.      **If unable to reach your doctor, please go to the [facility] Emergency Room**    - Your referring physician will receive a full report of your examination.  - If you do not hear from your doctor's office within two weeks of your biopsy, please call them for your results.    You may be able to see your laboratory results in [employer] between 4 and 7 business days.  In some cases, your physician may not have viewed the results before they are released to [employer].  If you have questions regarding your results contact the physician who ordered the test/exam by phone or via [employer] by choosing \"Ask a Medical Question.\"

## 2024-05-15 NOTE — H&P
History & Physical Examination    Patient Name: Jer Krishnamurthy  MRN: [id number]  CSN: [id number]  YOB: 1970    Diagnosis: diarrhea, CRC screening    Medications Prior to Admission   Medication Sig Dispense Refill Last Dose    Tirzepatide-Weight Management (ZEPBOUND) 15 MG/0.5ML Subcutaneous Solution Auto-injector Inject 15 mg into the skin once a week. 2 mL 1     Insulin Syringes, Disposable, U-100 0.3 ML Does not apply Misc Use as directed twice daily 60 each 2     methylPREDNISolone (MEDROL) 4 MG Oral Tablet Therapy Pack As directed. (Patient not taking: Reported on 5/7/2024) 21 tablet 1 Not Taking    PEG 3350-KCl-Na Bicarb-NaCl (TRILYTE) 420 g Oral Recon Soln Take prep as directed by gastro office. May substitute with Trilyte/generic equivalent if needed. 1 each 0     diphenoxylate-atropine 2.5-0.025 MG Oral Tab Take 2 tablets by mouth in the morning, at noon, and at bedtime. (Patient not taking: Reported on 5/7/2024) 60 tablet 1 Not Taking    ALPRAZolam 0.25 MG Oral Tab Take 1 tablet (0.25 mg total) by mouth every 6 (six) hours as needed for Sleep. (Patient not taking: Reported on 5/7/2024) 30 tablet 1 Not Taking    Sildenafil Citrate (VIAGRA) 100 MG Oral Tab Take 1 tablet (100 mg total) by mouth as needed for Erectile Dysfunction. 24 tablet 3 PRN     No current facility-administered medications for this encounter.       Allergies: No Known Allergies    Past Medical History:    Back problem    High cholesterol    Osteoarthritis    Sleep apnea     Past Surgical History:   Procedure Laterality Date    Laminectomy,lumbar  11/22/16    Dr. Coombs    Leg/ankle surgery proc unlisted Right     torn ligaments    Ndsc ablation & rcnstj [facility] w/o bypass      Tonsillectomy       Family History   Problem Relation Age of Onset    Colon Cancer Neg      Social History     Tobacco Use    Smoking status: Some Days     Current packs/day: 0.00     Average packs/day: 1.5 packs/day for 10.0 years (15.0 ttl pk-yrs)      Types: Cigars, Cigarettes     Start date: 1992     Last attempt to quit: 2002     Years since quittin.3    Smokeless tobacco: Never   Substance Use Topics    Alcohol use: No     Comment: occ.         SYSTEM Check if Review is Normal Check if Physical Exam is Normal If not normal, please explain:   HEENT [X ] [ X]    NECK  [X ] [ X]    HEART [X ] [ X]    LUNGS [X ] [ X]    ABDOMEN [X ] [ X]    EXTREMITIES [X ] [ X]    OTHER        I have discussed the risks and benefits and alternatives of the procedure with the patient/family.  They understand and agree to proceed with plan of care.   I have reviewed the History and Physical done within the last 30 days.  Any changes noted above.    Romi Rodrigues MD  [facility] - Gastroenterology  5/15/2024

## 2024-05-15 NOTE — ANESTHESIA POSTPROCEDURE EVALUATION
Patient: Jer Krishnamurthy    Procedure Summary       Date: 05/15/24 Room / Location: [facility] ENDOSCOPY 04 / [facility] ENDOSCOPY    Anesthesia Start: 1528 Anesthesia Stop: 1624    Procedure: COLONOSCOPY Diagnosis:       Colon cancer screening      (hemorrhoids)    Surgeons: Romi Rodrigues MD Anesthesiologist: Jude Mcnally MD    Anesthesia Type: MAC ASA Status: 2            Anesthesia Type: MAC    Vitals Value Taken Time   /89 05/15/24 1620   Temp 98.6 05/15/24 1624   Pulse 78 05/15/24 1623   Resp 21 05/15/24 1623   SpO2 99 % 05/15/24 1622   Vitals shown include unfiled device data.    [facility] AN Post Evaluation:   Patient Evaluated in PACU  Patient Participation: complete - patient participated  Level of Consciousness: awake  Pain Score: 2  Pain Management: adequate  Airway Patency:patent  Dental exam unchanged from preop  Yes    Cardiovascular Status: hemodynamically stable  Respiratory Status: spontaneous ventilation  Postoperative Hydration euvolemic      Jude Mcnally MD  5/15/2024 4:24 PM

## 2024-05-15 NOTE — ANESTHESIA POSTPROCEDURE EVALUATION
Patient: Jer Krishnamurthy    Procedure Summary       Date: 05/15/24 Room / Location: [facility] ENDOSCOPY 04 / [facility] ENDOSCOPY    Anesthesia Start: 1528 Anesthesia Stop:     Procedure: COLONOSCOPY Diagnosis:       Colon cancer screening      (hemorrhoids)    Surgeons: Romi Rodrigues MD Anesthesiologist: Jude Mcnally MD    Anesthesia Type: MAC ASA Status: 2            Anesthesia Type: MAC    Vitals Value Taken Time   /89 05/15/24 1620   Temp 98.4 05/15/24 1624   Pulse 78 05/15/24 1623   Resp 21 05/15/24 1623   SpO2 99 % 05/15/24 1622   Vitals shown include unfiled device data.    [facility] AN Post Evaluation:   Patient Evaluated in PACU  Patient Participation: complete - patient participated  Level of Consciousness: awake  Pain Score: 2  Pain Management: adequate  Airway Patency:patent  Dental exam unchanged from preop  Yes    Cardiovascular Status: hemodynamically stable  Respiratory Status: spontaneous ventilation  Postoperative Hydration euvolemic      Jude Mcnally MD  5/15/2024 4:24 PM

## 2024-05-15 NOTE — ANESTHESIA PREPROCEDURE EVALUATION
Anesthesia PreOp Note    HPI:     Jer Krishnamurthy is a 53 year old male who presents for preoperative consultation requested by: Romi Rodrigues MD    Date of Surgery: 5/15/2024    Procedure(s):  COLONOSCOPY  Indication: Colon cancer screening    Relevant Problems   No relevant active problems       NPO:  Last Liquid Consumption Date: 05/15/24  Last Liquid Consumption Time: 0830  Last Solid Consumption Date: 05/14/24  Last Solid Consumption Time: 1000  Last Liquid Consumption Date: 05/15/24          History Review:  Patient Active Problem List    Diagnosis Date Noted    Rhinitis, allergic 05/20/2019    History of lumbar laminectomy 02/06/2017    Hypercholesteremia 11/14/2016    Degenerative disc disease, lumbar 10/12/2016    Lumbar disc herniation with radiculopathy 10/12/2016    Sleep apnea 08/08/2016    SVT (supraventricular tachycardia) (HCC) 08/08/2016       Past Medical History:    Back problem    High cholesterol    Osteoarthritis    Sleep apnea       Past Surgical History:   Procedure Laterality Date    Laminectomy,lumbar  11/22/16    Dr. Coombs    Leg/ankle surgery proc unlisted Right     torn ligaments    Ndsc ablation & rcnstj [facility] w/o bypass      Tonsillectomy         Medications Prior to Admission   Medication Sig Dispense Refill Last Dose    Tirzepatide-Weight Management (ZEPBOUND) 15 MG/0.5ML Subcutaneous Solution Auto-injector Inject 15 mg into the skin once a week. 2 mL 1 5/5/2024    Insulin Syringes, Disposable, U-100 0.3 ML Does not apply Misc Use as directed twice daily 60 each 2 not taking    methylPREDNISolone (MEDROL) 4 MG Oral Tablet Therapy Pack As directed. (Patient not taking: Reported on 5/7/2024) 21 tablet 1 Not Taking    PEG 3350-KCl-Na Bicarb-NaCl (TRILYTE) 420 g Oral Recon Soln Take prep as directed by gastro office. May substitute with Trilyte/generic equivalent if needed. 1 each 0     diphenoxylate-atropine 2.5-0.025 MG Oral Tab Take 2 tablets by mouth in the morning, at noon, and  at bedtime. (Patient not taking: Reported on 2024) 60 tablet 1 Not Taking    ALPRAZolam 0.25 MG Oral Tab Take 1 tablet (0.25 mg total) by mouth every 6 (six) hours as needed for Sleep. (Patient not taking: Reported on 2024) 30 tablet 1 Not Taking    Sildenafil Citrate (VIAGRA) 100 MG Oral Tab Take 1 tablet (100 mg total) by mouth as needed for Erectile Dysfunction. 24 tablet 3 PRN     Current Facility-Administered Medications Ordered in Epic   Medication Dose Route Frequency Provider Last Rate Last Admin    lactated ringers infusion   Intravenous Continuous Romi Rodrigues MD 20 mL/hr at 05/15/24 1415 New Bag at 05/15/24 1415     No current [facility]-ordered outpatient medications on file.       Allergies   Allergen Reactions    Seasonal UNKNOWN       Family History   Problem Relation Age of Onset    Colon Cancer Neg      Social History     Socioeconomic History    Marital status:    Tobacco Use    Smoking status: Some Days     Current packs/day: 0.00     Average packs/day: 1.5 packs/day for 10.0 years (15.0 ttl pk-yrs)     Types: Cigars, Cigarettes     Start date: 1992     Last attempt to quit: 2002     Years since quittin.3    Smokeless tobacco: Never    Tobacco comments:     1-2 cigars/week   Vaping Use    Vaping status: Never Used   Substance and Sexual Activity    Alcohol use: No     Comment: occ.    Drug use: Yes     Types: Cannabis     Comment: gummies for sleep       Available pre-op labs reviewed.             Vital Signs:  Body mass index is 38.63 kg/m².   height is 1.803 m (5' 11\") and weight is 125.6 kg (277 lb). His blood pressure is 139/83 and his pulse is 65. His respiration is 12 and oxygen saturation is 97%.   Vitals:    24 1550 05/15/24 1408   BP:  139/83   Pulse:  65   Resp:  12   SpO2:  97%   Weight: 125.6 kg (277 lb) 125.6 kg (277 lb)   Height: 1.803 m (5' 11\") 1.803 m (5' 11\")        Anesthesia Evaluation     Patient summary reviewed and Nursing notes reviewed     Airway   Mallampati: II  Dental - Dentition appears grossly intact     Pulmonary - normal exam   (+) sleep apnea  Cardiovascular - normal exam    ROS comment: Hx SVT tx with ablation 2016    Neuro/Psych    (+)  neuromuscular disease,        GI/Hepatic/Renal    (+) bowel prep    Endo/Other      Comments: Morbid obesity BMI 39  Abdominal  - normal exam                 Anesthesia Plan:   ASA:  2  Plan:   MAC  Informed Consent Plan and Risks Discussed With:  Patient      I have informed Jer NULL Yessy and/or legal guardian or family member of the nature of the anesthetic plan, benefits, risks including possible dental damage if relevant, major complications, and any alternative forms of anesthetic management.   All of the patient's questions were answered to the best of my ability. The patient desires the anesthetic management as planned.  Veronica Santiago CRNA  5/15/2024 2:51 PM  Present on Admission:  **None**

## 2024-05-15 NOTE — TELEPHONE ENCOUNTER
Recall colonoscopy in 10 years per Dr Rodrigues.    Colon done 05/15/2024    Health maintenance updated and message sent to patient outreach to repeat colonoscopy in 10 years

## 2024-05-15 NOTE — OPERATIVE REPORT
COLONOSCOPY REPORT    Jer Krishnamurthy     1970 Age 53 year old   PCP CYNTHIA LUTHER MD Endoscopist Romi Rodrigues MD     Date of procedure: 05/15/24    Procedure: Colonoscopy w/ brush/wash    Pre-operative diagnosis: CRC screening    Post-operative diagnosis: internal hemorrhoids    Medications: MAC    Withdrawal time: 23 minutes    Procedure:  Informed consent was obtained from the patient after the risks of the procedure were discussed, including but not limited to bleeding, perforation, aspiration, infection, or possibility of a missed lesion. After discussions of the risks/benefits and alternatives to this procedure, as well as the planned sedation, the patient was placed in the left lateral decubitus position and begun on continuous blood pressure pulse oximetry and EKG monitoring and this was maintained throughout the procedure. Once an adequate level of sedation was obtained a digital rectal exam was completed. Then the lubricated tip of the [id number]-[id number] diagnostic video colonoscope was inserted and advanced without difficulty to the cecum using the CO2 insufflation technique. The cecum was identified by localizing the trifold, the appendix and the ileocecal valve. Withdrawal was begun with thorough washing and careful examination of the colonic walls and folds. A routine second examination of the cecum/ascending colon was performed. Photodocumentation was obtained. The bowel prep was good. Views of the colon were good with washing. I then carefully withdrew the instrument from the patient who tolerated the procedure well.     Complications: none.    Findings:   1. No polyps were noted.       2. Diverticulosis: none.    3. Terminal ileum: the visualized mucosa appeared normal.    4. A retroflexed view of the rectum revealed small internal hemorrhoids.    5. The colonic mucosa throughout the colon was carefully examined and showed normal vascular pattern, without evidence of angioectasias or  inflammation.     6. SHAQ: normal rectal tone, no masses palpated.     Impression:   Small internal hemorrhoids.  Otherwise unremarkable ileocolonoscopy.    Recommend:  The next colonoscopy is recommended in 10 years. If new signs or symptoms develop, colonoscopy may need to be repeated sooner.   High fiber diet.  Monitor for blood in the stool. If having more than just tinge of blood, call office or go to the ER.    >>>If tissue was obtained and you have not received your pathology results either by phone or letter within 2 weeks, please call our office at [phone].    Specimens: none.     Blood loss: <1 ml

## 2024-06-26 ENCOUNTER — PATIENT MESSAGE (OUTPATIENT)
Facility: CLINIC | Age: 54
End: 2024-06-26

## 2024-06-26 NOTE — TELEPHONE ENCOUNTER
From: Jer Krishnamurthy  To: Ceci Delaney  Sent: 6/26/2024 3:15 PM CDT  Subject: Had my procedure     Hello. Had my colonoscopy and didn’t know if there was a next step. Nothing has changed. I’m still having stomach issues. Please let me know. Thanks.

## 2024-06-28 RX ORDER — PANTOPRAZOLE SODIUM 40 MG/1
40 TABLET, DELAYED RELEASE ORAL
Qty: 30 TABLET | Refills: 0 | Status: SHIPPED | OUTPATIENT
Start: 2024-06-28 | End: 2024-07-28

## 2024-06-28 NOTE — TELEPHONE ENCOUNTER
[phone] (home)   Telephone Information:   Mobile [phone]     Called patient to schedule.     Left message to call back.

## 2025-01-28 ENCOUNTER — OFFICE VISIT (OUTPATIENT)
Dept: AUDIOLOGY | Facility: CLINIC | Age: 55
End: 2025-01-28
Payer: COMMERCIAL

## 2025-01-28 ENCOUNTER — OFFICE VISIT (OUTPATIENT)
Dept: OTOLARYNGOLOGY | Facility: CLINIC | Age: 55
End: 2025-01-28
Payer: COMMERCIAL

## 2025-01-28 DIAGNOSIS — H90.3 SENSORINEURAL HEARING LOSS (SNHL) OF BOTH EARS: ICD-10-CM

## 2025-01-28 DIAGNOSIS — H91.90 HEARING LOSS, UNSPECIFIED HEARING LOSS TYPE, UNSPECIFIED LATERALITY: Primary | ICD-10-CM

## 2025-01-28 DIAGNOSIS — H93.19 TINNITUS, UNSPECIFIED LATERALITY: ICD-10-CM

## 2025-01-28 DIAGNOSIS — H90.3 SENSORINEURAL HEARING LOSS (SNHL) OF BOTH EARS: Primary | ICD-10-CM

## 2025-01-28 PROCEDURE — 92557 COMPREHENSIVE HEARING TEST: CPT | Performed by: AUDIOLOGIST

## 2025-01-28 PROCEDURE — 99204 OFFICE O/P NEW MOD 45 MIN: CPT | Performed by: STUDENT IN AN ORGANIZED HEALTH CARE EDUCATION/TRAINING PROGRAM

## 2025-01-28 PROCEDURE — 92504 EAR MICROSCOPY EXAMINATION: CPT | Performed by: STUDENT IN AN ORGANIZED HEALTH CARE EDUCATION/TRAINING PROGRAM

## 2025-01-28 PROCEDURE — 92567 TYMPANOMETRY: CPT | Performed by: AUDIOLOGIST

## 2025-01-28 NOTE — PROGRESS NOTES
Jer Krishnamurthy is a 54 year old male.   Chief Complaint   Patient presents with    Hearing Loss     Decreasing hearing of right ear with crackling sound  Pt was in the      HPI:   54-year-old presents with a crackling sound in his right ear this has been on and off for about a week now.  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  He was also a  for more than 20 years.    Current Outpatient Medications   Medication Sig Dispense Refill    zolpidem 10 MG Oral Tab Take 1 tablet (10 mg total) by mouth nightly. 30 tablet 0    Tirzepatide-Weight Management (ZEPBOUND) 15 MG/0.5ML Subcutaneous Solution Auto-injector Inject 15 mg into the skin once a week. 2 mL 0    Sildenafil Citrate (VIAGRA) 100 MG Oral Tab Take 1 tablet (100 mg total) by mouth as needed for Erectile Dysfunction. 24 tablet 3      Past Medical History:    Back problem    High cholesterol    Osteoarthritis    Sleep apnea      Social History:  Social History     Socioeconomic History    Marital status:    Tobacco Use    Smoking status: Some Days     Current packs/day: 0.00     Average packs/day: 1.5 packs/day for 10.0 years (15.0 ttl pk-yrs)     Types: Cigars, Cigarettes     Start date: 1992     Last attempt to quit: 2002     Years since quittin.0    Smokeless tobacco: Never    Tobacco comments:     1-2 cigars/week   Vaping Use    Vaping status: Never Used   Substance and Sexual Activity    Alcohol use: No     Comment: occ.    Drug use: Yes     Types: Cannabis     Comment: gummies for sleep      Past Surgical History:   Procedure Laterality Date    Colonoscopy N/A 5/15/2024    Procedure: COLONOSCOPY;  Surgeon: Romi Rodrigues MD;  Location: [facility] ENDOSCOPY    Laminectomy,lumbar  16    Dr. Coombs    Leg/ankle surgery proc unlisted Right     torn ligaments    Ndsc ablation & rcnstj [facility] w/o bypass      Tonsillectomy           EXAM:   There were no  vitals taken for this visit.    System Details   Skin Inspection - Normal.   Constitutional Overall appearance - Normal.   Head/Face Symmetric, TMJ tenderness not present    Eyes EOMI, PERRL   Right ear:  Canal clear, TM intact, no SHANI   Left ear:  Canal clear, TM intact, no SHANI   Nose: Septum midline, inferior turbinates not enlarged, nasal valves without collapse    Oral cavity/Oropharynx: No lesions or masses on inspection or palpation, tonsils symmetric    Neck: Soft without LAD, thyroid not enlarged  Voice clear/ no stridor   Other:      SCOPES AND PROCEDURES:         AUDIOGRAM AND IMAGING:         IMPRESSION:   1. Hearing loss, unspecified hearing loss type, unspecified laterality  - Audiology Referral - [place] ([facility])    2. Sensorineural hearing loss (SNHL) of both ears    3. Tinnitus, unspecified laterality       Recommendations:  -He had a normal ear exam.  His audiogram demonstrated a significant notch at 4000 Hz in each ear down to 50 dB that was symmetric.  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      Colby Clinton MD  1/28/2025  1:39 PM

## (undated) DEVICE — Device: Brand: DUAL NARE NASAL CANNULAE FEMALE LUER CON 7FT O2 TUBE

## (undated) DEVICE — MEDI-VAC NON-CONDUCTIVE SUCTION TUBING 6MM X 1.8M (6FT.) L: Brand: CARDINAL HEALTH

## (undated) DEVICE — KIT ENDO ORCAPOD 160/180/190

## (undated) DEVICE — KIT CLEAN ENDOKIT 1.1OZ GOWNX2

## (undated) DEVICE — 60 ML SYRINGE REGULAR TIP: Brand: MONOJECT

## (undated) NOTE — Clinical Note
04/19/2017      To Whom it May Concern:    Rich [place] is under my care and may return to work sedentary 5 days a week for 8 hours with frequent breaks until he is reevaluated for work status on 07/01/2017.     If you have any questions please

## (undated) NOTE — LETTER
[facility]  [address], [place], [place]    Authorization for Surgical Operation and Procedure                               I hereby authorize Romi Rodrigues MD, my physician and his/her assistants (if applicable), which may include medical students, residents, and/or fellows, to perform the following surgical operation/ procedure and administer such anesthesia as may be determined necessary by my physician: Operation/Procedure name (s) COLONOSCOPY on Jer NULL Yessy   2.   I recognize that during the surgical operation/procedure, unforeseen conditions may necessitate additional or different procedures than those listed above.  I, therefore, further authorize and request that the above-named surgeon, assistants, or designees perform such procedures as are, in their judgment, necessary and desirable.    3.   My surgeon/physician has discussed prior to my surgery the potential benefits, risks and side effects of this procedure; the likelihood of achieving goals; and potential problems that might occur during recuperation.  They also discussed reasonable alternatives to the procedure, including risks, benefits, and side effects related to the alternatives and risks related to not receiving this procedure.  I have had all my questions answered and I acknowledge that no guarantee has been made as to the result that may be obtained.    4.   Should the need arise during my operation/procedure, which includes change of level of care prior to discharge, I also consent to the administration of blood and/or blood products.  Further, I understand that despite careful testing and screening of blood or blood products by collecting agencies, I may still be subject to ill effects as a result of receiving a blood transfusion and/or blood products.  The following are some, but not all, of the potential risks that can occur: fever and allergic reactions, hemolytic reactions, transmission of diseases such as  Hepatitis, AIDS and Cytomegalovirus (CMV) and fluid overload.  In the event that I wish to have an autologous transfusion of my own blood, or a directed donor transfusion, I will discuss this with my physician.  Check only if Refusing Blood or Blood Products  I understand refusal of blood or blood products as deemed necessary by my physician may have serious consequences to my condition to include possible death. I hereby assume responsibility for my refusal and release the hospital, its personnel, and my physicians from any responsibility for the consequences of my refusal.    o  Refuse   5.   I authorize the use of any specimen, organs, tissues, body parts or foreign objects that may be removed from my body during the operation/procedure for diagnosis, research or teaching purposes and their subsequent disposal by hospital authorities.  I also authorize the release of specimen test results and/or written reports to my treating physician on the hospital medical staff or other referring or consulting physicians involved in my care, at the discretion of the Pathologist or my treating physician.    6.   I consent to the photographing or videotaping of the operations or procedures to be performed, including appropriate portions of my body for medical, scientific, or educational purposes, provided my identity is not revealed by the pictures or by descriptive texts accompanying them.  If the procedure has been photographed/videotaped, the surgeon will obtain the original picture, image, videotape or CD.  The hospital will not be responsible for storage, release or maintenance of the picture, image, tape or CD.    7.   I consent to the presence of a  or observers in the operating room as deemed necessary by my physician or their designees.    8.   I recognize that in the event my procedure results in extended X-Ray/fluoroscopy time, I may develop a skin reaction.    9. If I have a Do Not Attempt  Resuscitation (DNAR) order in place, that status will be suspended while in the operating room, procedural suite, and during the recovery period unless otherwise explicitly stated by me (or a person authorized to consent on my behalf). The surgeon or my attending physician will determine when the applicable recovery period ends for purposes of reinstating the DNAR order.  10. Patients having a sterilization procedure: I understand that if the procedure is successful the results will be permanent and it will therefore be impossible for me to inseminate, conceive, or bear children.  I also understand that the procedure is intended to result in sterility, although the result has not been guaranteed.   11. I acknowledge that my physician has explained sedation/analgesia administration to me including the risk and benefits I consent to the administration of sedation/analgesia as may be necessary or desirable in the judgment of my physician.    I CERTIFY THAT I HAVE READ AND FULLY UNDERSTAND THE ABOVE CONSENT TO OPERATION and/or OTHER PROCEDURE.     ____________________________________  _________________________________        ______________________________  Signature of Patient    Signature of Responsible Person                Printed Name of Responsible Person                                      ____________________________________  _____________________________                ________________________________  Signature of Witness        Date  Time         Relationship to Patient    STATEMENT OF PHYSICIAN My signature below affirms that prior to the time of the procedure; I have explained to the patient and/or his/her legal representative, the risks and benefits involved in the proposed treatment and any reasonable alternative to the proposed treatment. I have also explained the risks and benefits involved in refusal of the proposed treatment and alternatives to the proposed treatment and have answered the patient's  questions. If I have a significant financial interest in a co-management agreement or a significant financial interest in any product or implant, or other significant relationship used in this procedure/surgery, I have disclosed this and had a discussion with my patient.     _____________________________________________________              _____________________________  (Signature of Physician)                                                                                         (Date)                                   (Time)  Patient Name: Jer Krishnamurthy      : 1970      Printed: 5/10/2024     Medical Record #: [id number]                                      Page 1 of 1

## (undated) NOTE — LETTER
Date: 6/28/2017    Patient Name: Rin Silva          To Whom it may concern: This letter has been written at the patient's request. The above patient was seen at the [facility] for treatment of a medical condition.     The patient

## (undated) NOTE — MR AVS SNAPSHOT
[address] at [facility]  [address]  [facility], [address]               Thank you for choosing us for your health care visit with Sofya Medina MD.  We are glad to serve you and happy to provide Take 1 tablet by mouth every 6 (six) hours as needed for Pain. Commonly known as:  NORCO           * HYDROcodone-acetaminophen  MG Tabs   Take 1 tablet by mouth every 6 (six) hours as needed for Pain.    Commonly known as:  NORCO           Meloxicam

## (undated) NOTE — LETTER
[facility]  Administration of Anesthesia  IJer agree to be cared for by a physician anesthesiologist alone and/or with a nurse anesthetist, who is specially trained to monitor me and give me medicine to put me to sleep or keep me comfortable during my procedure    I understand that my anesthesiologist and/or anesthetist is not an employee or agent of [facility] or [employer]. He or she works for [facility]    As the patient asking for anesthesia services, I agree to:  Allow the anesthesiologist (anesthesia doctor) to give me medicine and do additional procedures as necessary. Some examples are: Starting or using an “IV” to give me medicine, fluids or blood during my procedure, and having a breathing tube placed to help me breathe when I’m asleep (intubation). In the event that my heart stops working properly, I understand that my anesthesiologist will make every effort to sustain my life, unless otherwise directed by [facility] Do Not Resuscitate documents.  Tell my anesthesia doctor before my procedure:  If I am pregnant.  The last time that I ate or drank.  iii. All of the medicines I take (including prescriptions, herbal supplements, and pills I can buy without a prescription (including street drugs/illegal medications). Failure to inform my anesthesiologist about these medicines may increase my risk of anesthetic complications.  iv.If I am allergic to anything or have had a reaction to anesthesia before.  I understand how the anesthesia medicine will help me (benefits).  I understand that with any type of anesthesia medicine there are risks:  The most common risks are: nausea, vomiting, sore throat, muscle soreness, damage to my eyes, mouth, or teeth (from breathing tube placement).  Rare risks include: remembering what happened during my procedure, allergic reactions to medications, injury to my airway, heart, lungs, vision, nerves, or  muscles and in extremely rare instances death.  My doctor has explained to me other choices available to me for my care (alternatives).  Pregnant Patients (“epidural”):  I understand that the risks of having an epidural (medicine given into my back to help control pain during labor), include itching, low blood pressure, difficulty urinating, headache or slowing of the baby’s heart. Very rare risks include infection, bleeding, seizure, irregular heart rhythms and nerve injury.  Regional Anesthesia (“spinal”, “epidural”, & “nerve blocks”):  I understand that rare but potential complications include headache, bleeding, infection, seizure, irregular heart rhythms, and nerve injury.    _____________________________________________________________________________  Patient (or Representative) Signature/Relationship to Patient  Date   Time    _____________________________________________________________________________   Name (if used)    Language/Organization   Time    _____________________________________________________________________________  Nurse Anesthetist Signature     Date   Time  _____________________________________________________________________________  Anesthesiologist Signature     Date   Time  I have discussed the procedure and information above with the patient (or patient’s representative) and answered their questions. The patient or their representative has agreed to have anesthesia services.    _____________________________________________________________________________  Witness        Date   Time  I have verified that the signature is that of the patient or patient’s representative, and that it was signed before the procedure  Patient Name: Jer Krishnamurthy     : 1970                 Printed: 5/10/2024 at 10:23 AM    Medical Record #: [id number]                                            Page 1 of 1  ----------ANESTHESIA CONSENT----------

## (undated) NOTE — MR AVS SNAPSHOT
[address] at [facility]  [address]  [facility], [address] [facility] [address]               Thank you for choosing us for your health care visit with Angeles De La Vega MD.  We are glad to serve you and happy to provide Commonly known as:  NAPROSYN           Ondansetron HCl 4 mg tablet   Take 1 tablet (4 mg total) by mouth every 4 (four) hours as needed for Nausea. Commonly known as:  Lonita Beam Misc   Use as instructed for assistance when walking.

## (undated) NOTE — Clinical Note
06/21/2017    To Whom it May Concern:    Pepito Goldberg is under my care and may return to work sedentary 5 days a week for 8 hours with frequent breaks until he is reevaluated for work status on 09/01/2017.     If you have any questions please f

## (undated) NOTE — Clinical Note
03/09/2017      To Whom it May Concern: The above patient is under my care and may return to work sedentary 3 days a week for 4 hours with frequent breaks until he is reevaluated for work status.   If you have any questions please feel free to co

## (undated) NOTE — MR AVS SNAPSHOT
[address] at [facility]  [address]  [facility], [address] [facility] [place]                Thank you for choosing us for your health care visit with Lolly Chau MD.  We are glad to serve you and happy to provide Take 1 tablet by mouth every 6 (six) hours as needed for Pain. What changed:  Another medication with the same name was added. Make sure you understand how and when to take each.    Commonly known as:  NORCO           * HYDROcodone-acetaminophen  MG medical emergencies, dial 911. Visit [email or web address] to learn more.            Visit [facility] online at  [email or web address]

## (undated) NOTE — MR AVS SNAPSHOT
[address] at [facility]  [address]  [facility], [address] [facility] [place]                Thank you for choosing us for your health care visit with Goldie Varela MD.  We are glad to serve you and happy to provide Take 1 tablet by mouth every 6 (six) hours as needed for Pain. Commonly known as:  NORCO           Meloxicam 15 MG Tabs   Take 1 tablet (15 mg total) by mouth daily.    Commonly known as:  MOBIC           methocarbamol 750 MG Tabs   Take 1 tablet (750 mg

## (undated) NOTE — MR AVS SNAPSHOT
[address] at [facility]  [address]  [facility], [address] [facility] [place]               Thank you for choosing us for your health care visit with Rafaela Frye MD.  We are glad to serve you and happy to provide Take 1 tablet (750 mg total) by mouth 3 (three) times daily. Commonly known as:  ROBAXIN-750           multivitamin Tabs   Take 1 tablet by mouth daily.            naproxen 500 MG Tabs   Take 1 tablet (500 mg total) by mouth 2 (two) times daily with meals Moderation of alcohol consumption Men: limit to <= 2 drinks* per day. Women and lighter weight persons: limit to <= 1 drink* per day.                       Healthy Diet and Regular Exercise  [employer] [facility] [facility] for making healthy food cho